# Patient Record
Sex: FEMALE | Race: WHITE | Employment: UNEMPLOYED | URBAN - NONMETROPOLITAN AREA
[De-identification: names, ages, dates, MRNs, and addresses within clinical notes are randomized per-mention and may not be internally consistent; named-entity substitution may affect disease eponyms.]

---

## 2023-04-28 ENCOUNTER — OFFICE VISIT (OUTPATIENT)
Dept: PRIMARY CARE CLINIC | Age: 18
End: 2023-04-28
Payer: COMMERCIAL

## 2023-04-28 VITALS
HEIGHT: 59 IN | DIASTOLIC BLOOD PRESSURE: 70 MMHG | WEIGHT: 264 LBS | HEART RATE: 94 BPM | TEMPERATURE: 97.6 F | SYSTOLIC BLOOD PRESSURE: 110 MMHG | OXYGEN SATURATION: 99 % | BODY MASS INDEX: 53.22 KG/M2

## 2023-04-28 DIAGNOSIS — E66.01 CLASS 3 SEVERE OBESITY WITH BODY MASS INDEX (BMI) OF 50.0 TO 59.9 IN ADULT, UNSPECIFIED OBESITY TYPE, UNSPECIFIED WHETHER SERIOUS COMORBIDITY PRESENT (HCC): Primary | ICD-10-CM

## 2023-04-28 DIAGNOSIS — Z30.09 ENCOUNTER FOR EVALUATION REGARDING CONTRACEPTION OPTIONS: ICD-10-CM

## 2023-04-28 DIAGNOSIS — L30.9 ECZEMA, UNSPECIFIED TYPE: ICD-10-CM

## 2023-04-28 PROCEDURE — 1036F TOBACCO NON-USER: CPT | Performed by: NURSE PRACTITIONER

## 2023-04-28 PROCEDURE — 99204 OFFICE O/P NEW MOD 45 MIN: CPT | Performed by: NURSE PRACTITIONER

## 2023-04-28 PROCEDURE — G8417 CALC BMI ABV UP PARAM F/U: HCPCS | Performed by: NURSE PRACTITIONER

## 2023-04-28 PROCEDURE — G8427 DOCREV CUR MEDS BY ELIG CLIN: HCPCS | Performed by: NURSE PRACTITIONER

## 2023-04-28 RX ORDER — TRIAMCINOLONE ACETONIDE 1 MG/G
CREAM TOPICAL
Qty: 45 G | Refills: 0 | Status: SHIPPED | OUTPATIENT
Start: 2023-04-28

## 2023-04-28 RX ORDER — PRAZOSIN HYDROCHLORIDE 2 MG/1
CAPSULE ORAL
COMMUNITY
Start: 2023-04-11

## 2023-04-28 RX ORDER — ACETAMINOPHEN AND CODEINE PHOSPHATE 120; 12 MG/5ML; MG/5ML
SOLUTION ORAL
COMMUNITY
Start: 2023-02-16

## 2023-04-28 RX ORDER — FLUOXETINE HYDROCHLORIDE 20 MG/1
20 CAPSULE ORAL NIGHTLY
COMMUNITY
Start: 2023-04-11

## 2023-04-28 RX ORDER — ACETAMINOPHEN 325 MG/1
TABLET ORAL EVERY 4 HOURS PRN
COMMUNITY
Start: 2013-03-20

## 2023-04-28 RX ORDER — OMEPRAZOLE 20 MG/1
20 CAPSULE, DELAYED RELEASE ORAL DAILY
COMMUNITY
Start: 2017-05-31

## 2023-04-28 SDOH — ECONOMIC STABILITY: HOUSING INSECURITY
IN THE LAST 12 MONTHS, WAS THERE A TIME WHEN YOU DID NOT HAVE A STEADY PLACE TO SLEEP OR SLEPT IN A SHELTER (INCLUDING NOW)?: NO

## 2023-04-28 SDOH — ECONOMIC STABILITY: FOOD INSECURITY: WITHIN THE PAST 12 MONTHS, THE FOOD YOU BOUGHT JUST DIDN'T LAST AND YOU DIDN'T HAVE MONEY TO GET MORE.: NEVER TRUE

## 2023-04-28 SDOH — ECONOMIC STABILITY: INCOME INSECURITY: HOW HARD IS IT FOR YOU TO PAY FOR THE VERY BASICS LIKE FOOD, HOUSING, MEDICAL CARE, AND HEATING?: NOT HARD AT ALL

## 2023-04-28 SDOH — ECONOMIC STABILITY: FOOD INSECURITY: WITHIN THE PAST 12 MONTHS, YOU WORRIED THAT YOUR FOOD WOULD RUN OUT BEFORE YOU GOT MONEY TO BUY MORE.: NEVER TRUE

## 2023-04-28 ASSESSMENT — ANXIETY QUESTIONNAIRES
GAD7 TOTAL SCORE: 19
4. TROUBLE RELAXING: NEARLY EVERY DAY
5. BEING SO RESTLESS THAT IT IS HARD TO SIT STILL: 3
IF YOU CHECKED OFF ANY PROBLEMS ON THIS QUESTIONNAIRE, HOW DIFFICULT HAVE THESE PROBLEMS MADE IT FOR YOU TO DO YOUR WORK, TAKE CARE OF THINGS AT HOME, OR GET ALONG WITH OTHER PEOPLE: EXTREMELY DIFFICULT
3. WORRYING TOO MUCH ABOUT DIFFERENT THINGS: 3
7. FEELING AFRAID AS IF SOMETHING AWFUL MIGHT HAPPEN: 3
IF YOU CHECKED OFF ANY PROBLEMS ON THIS QUESTIONNAIRE, HOW DIFFICULT HAVE THESE PROBLEMS MADE IT FOR YOU TO DO YOUR WORK, TAKE CARE OF THINGS AT HOME, OR GET ALONG WITH OTHER PEOPLE: EXTREMELY DIFFICULT
1. FEELING NERVOUS, ANXIOUS, OR ON EDGE: SEVERAL DAYS
7. FEELING AFRAID AS IF SOMETHING AWFUL MIGHT HAPPEN: NEARLY EVERY DAY
1. FEELING NERVOUS, ANXIOUS, OR ON EDGE: 1
2. NOT BEING ABLE TO STOP OR CONTROL WORRYING: NEARLY EVERY DAY
6. BECOMING EASILY ANNOYED OR IRRITABLE: NEARLY EVERY DAY
5. BEING SO RESTLESS THAT IT IS HARD TO SIT STILL: NEARLY EVERY DAY
6. BECOMING EASILY ANNOYED OR IRRITABLE: 3
3. WORRYING TOO MUCH ABOUT DIFFERENT THINGS: NEARLY EVERY DAY
2. NOT BEING ABLE TO STOP OR CONTROL WORRYING: 3
4. TROUBLE RELAXING: 3

## 2023-04-28 ASSESSMENT — ENCOUNTER SYMPTOMS
RESPIRATORY NEGATIVE: 1
GASTROINTESTINAL NEGATIVE: 1
EYES NEGATIVE: 1

## 2023-04-28 ASSESSMENT — LIFESTYLE VARIABLES
HOW OFTEN DO YOU HAVE A DRINK CONTAINING ALCOHOL: NEVER
HOW OFTEN DO YOU HAVE SIX OR MORE DRINKS ON ONE OCCASION: 1
HOW MANY STANDARD DRINKS CONTAINING ALCOHOL DO YOU HAVE ON A TYPICAL DAY: 0
HOW OFTEN DO YOU HAVE A DRINK CONTAINING ALCOHOL: 1
HOW MANY STANDARD DRINKS CONTAINING ALCOHOL DO YOU HAVE ON A TYPICAL DAY: PATIENT DOES NOT DRINK

## 2023-04-28 ASSESSMENT — PATIENT HEALTH QUESTIONNAIRE - PHQ9
6. FEELING BAD ABOUT YOURSELF - OR THAT YOU ARE A FAILURE OR HAVE LET YOURSELF OR YOUR FAMILY DOWN: 3
1. LITTLE INTEREST OR PLEASURE IN DOING THINGS: 3
2. FEELING DOWN, DEPRESSED OR HOPELESS: 1
3. TROUBLE FALLING OR STAYING ASLEEP: 3
10. IF YOU CHECKED OFF ANY PROBLEMS, HOW DIFFICULT HAVE THESE PROBLEMS MADE IT FOR YOU TO DO YOUR WORK, TAKE CARE OF THINGS AT HOME, OR GET ALONG WITH OTHER PEOPLE: 3
SUM OF ALL RESPONSES TO PHQ QUESTIONS 1-9: 22
SUM OF ALL RESPONSES TO PHQ QUESTIONS 1-9: 22
7. TROUBLE CONCENTRATING ON THINGS, SUCH AS READING THE NEWSPAPER OR WATCHING TELEVISION: 3
SUM OF ALL RESPONSES TO PHQ QUESTIONS 1-9: 22
5. POOR APPETITE OR OVEREATING: 3
9. THOUGHTS THAT YOU WOULD BE BETTER OFF DEAD, OR OF HURTING YOURSELF: 0
SUM OF ALL RESPONSES TO PHQ9 QUESTIONS 1 & 2: 4
8. MOVING OR SPEAKING SO SLOWLY THAT OTHER PEOPLE COULD HAVE NOTICED. OR THE OPPOSITE, BEING SO FIGETY OR RESTLESS THAT YOU HAVE BEEN MOVING AROUND A LOT MORE THAN USUAL: 3
4. FEELING TIRED OR HAVING LITTLE ENERGY: 3
SUM OF ALL RESPONSES TO PHQ QUESTIONS 1-9: 22

## 2023-04-28 NOTE — PROGRESS NOTES
Subjective  CC: Patient presents to [facility]. Last saw pediatrician, it's been a few years. HPI:   Patient presents to [facility]. She does follow with psychiatry, she sees Briana Ortega. She does not follow with any other specialists. We reviewed her past medical history, family history, and social history. Her primary concern today is related to her weight. She reports she has gained about 30 pounds over the last 3 years, she does not feel that her activity level or diet has changed significantly. She would be interested in bariatric surgery, but she does need evaluation prior to this. She would also like to be seen by gynecology for evaluation for Implanon. Additionally, she has eczematous changes to bilateral hands and over-the-counter therapies are not working. She feels that her psychiatric conditions are very well controlled. ROS:  Review of Systems   Constitutional:         Weight gain, obesity   HENT: Negative. Eyes: Negative. Respiratory: Negative. Cardiovascular: Negative. Gastrointestinal: Negative. Genitourinary: Negative. Musculoskeletal: Negative. Skin: Negative. Neurological:         Eczema   Psychiatric/Behavioral:          See above - follows with psych        Current Outpatient Medications:     acetaminophen (TYLENOL) 325 MG tablet, Take by mouth every 4 hours as needed, Disp: , Rfl:     FLUoxetine (PROZAC) 20 MG capsule, Take 1 capsule by mouth nightly, Disp: , Rfl:     Melatonin 5 MG CAPS, Take by mouth nightly, Disp: , Rfl:     norethindrone (MICRONOR) 0.35 MG tablet, , Disp: , Rfl:     omeprazole (PRILOSEC) 20 MG delayed release capsule, Take 1 capsule by mouth daily, Disp: , Rfl:     prazosin (MINIPRESS) 2 MG capsule, take 2 capsules by mouth at bedtime if needed, Disp: , Rfl:     triamcinolone (KENALOG) 0.1 % cream, Apply topically 2 times daily. , Disp: 45 g, Rfl: 0   No Known Allergies     PMH:  Past Medical History:   Diagnosis Date    ADHD

## 2023-05-01 DIAGNOSIS — E66.01 CLASS 3 SEVERE OBESITY WITH BODY MASS INDEX (BMI) OF 50.0 TO 59.9 IN ADULT, UNSPECIFIED OBESITY TYPE, UNSPECIFIED WHETHER SERIOUS COMORBIDITY PRESENT (HCC): ICD-10-CM

## 2023-05-01 LAB
ALBUMIN SERPL-MCNC: 3.9 G/DL (ref 3.5–5.2)
ALP SERPL-CCNC: 68 U/L (ref 35–104)
ALT SERPL-CCNC: 19 U/L (ref 0–32)
ANION GAP SERPL CALCULATED.3IONS-SCNC: 12 MMOL/L (ref 7–16)
AST SERPL-CCNC: 22 U/L (ref 0–31)
BASOPHILS # BLD: 0.04 E9/L (ref 0–0.2)
BASOPHILS NFR BLD: 0.7 % (ref 0–2)
BILIRUB SERPL-MCNC: <0.2 MG/DL (ref 0–1.2)
BUN SERPL-MCNC: 13 MG/DL (ref 6–20)
CALCIUM SERPL-MCNC: 9 MG/DL (ref 8.6–10.2)
CHLORIDE SERPL-SCNC: 107 MMOL/L (ref 98–107)
CHOLESTEROL, TOTAL: 166 MG/DL (ref 0–199)
CO2 SERPL-SCNC: 21 MMOL/L (ref 22–29)
CREAT SERPL-MCNC: 0.6 MG/DL (ref 0.5–1)
EOSINOPHIL # BLD: 0.08 E9/L (ref 0.05–0.5)
EOSINOPHIL NFR BLD: 1.3 % (ref 0–6)
ERYTHROCYTE [DISTWIDTH] IN BLOOD BY AUTOMATED COUNT: 16.9 FL (ref 11.5–15)
GLUCOSE SERPL-MCNC: 94 MG/DL (ref 74–99)
HCT VFR BLD AUTO: 37.7 % (ref 34–48)
HDLC SERPL-MCNC: 44 MG/DL
HGB BLD-MCNC: 10.4 G/DL (ref 11.5–15.5)
IMM GRANULOCYTES # BLD: 0.01 E9/L
IMM GRANULOCYTES NFR BLD: 0.2 % (ref 0–5)
LDLC SERPL CALC-MCNC: 95 MG/DL (ref 0–99)
LYMPHOCYTES # BLD: 2.25 E9/L (ref 1.5–4)
LYMPHOCYTES NFR BLD: 36.6 % (ref 20–42)
MCH RBC QN AUTO: 22.8 PG (ref 26–35)
MCHC RBC AUTO-ENTMCNC: 27.6 % (ref 32–34.5)
MCV RBC AUTO: 82.5 FL (ref 80–99.9)
MONOCYTES # BLD: 0.6 E9/L (ref 0.1–0.95)
MONOCYTES NFR BLD: 9.8 % (ref 2–12)
NEUTROPHILS # BLD: 3.17 E9/L (ref 1.8–7.3)
NEUTS SEG NFR BLD: 51.4 % (ref 43–80)
PLATELET # BLD AUTO: 357 E9/L (ref 130–450)
PMV BLD AUTO: 11.5 FL (ref 7–12)
POTASSIUM SERPL-SCNC: 4.1 MMOL/L (ref 3.5–5)
PROT SERPL-MCNC: 6.5 G/DL (ref 6.4–8.3)
RBC # BLD AUTO: 4.57 E12/L (ref 3.5–5.5)
SODIUM SERPL-SCNC: 140 MMOL/L (ref 132–146)
T4 FREE SERPL-MCNC: 0.94 NG/DL (ref 0.93–1.7)
TRIGL SERPL-MCNC: 136 MG/DL (ref 0–149)
TSH SERPL-MCNC: 3.99 UIU/ML (ref 0.27–4.2)
VLDLC SERPL CALC-MCNC: 27 MG/DL
WBC # BLD: 6.2 E9/L (ref 4.5–11.5)

## 2023-05-02 DIAGNOSIS — D64.9 ANEMIA, UNSPECIFIED TYPE: Primary | ICD-10-CM

## 2023-05-08 DIAGNOSIS — D64.9 ANEMIA, UNSPECIFIED TYPE: ICD-10-CM

## 2023-05-08 LAB
IRON SATN MFR SERPL: 7 % (ref 15–50)
IRON SERPL-MCNC: 29 MCG/DL (ref 37–145)
TIBC SERPL-MCNC: 419 MCG/DL (ref 250–450)
VIT B12 SERPL-MCNC: 315 PG/ML (ref 211–946)

## 2023-05-09 DIAGNOSIS — D50.9 IRON DEFICIENCY ANEMIA, UNSPECIFIED IRON DEFICIENCY ANEMIA TYPE: Primary | ICD-10-CM

## 2023-10-25 ENCOUNTER — TELEPHONE (OUTPATIENT)
Dept: PRIMARY CARE CLINIC | Age: 18
End: 2023-10-25

## 2023-10-25 NOTE — TELEPHONE ENCOUNTER
Called pt to reschedule appointment on 11/10 due to [facility] being on [id number] [address], no answer, left message to return call.

## 2025-07-20 ASSESSMENT — ANXIETY QUESTIONNAIRES
5. BEING SO RESTLESS THAT IT IS HARD TO SIT STILL: MORE THAN HALF THE DAYS
3. WORRYING TOO MUCH ABOUT DIFFERENT THINGS: SEVERAL DAYS
6. BECOMING EASILY ANNOYED OR IRRITABLE: MORE THAN HALF THE DAYS
2. NOT BEING ABLE TO STOP OR CONTROL WORRYING: SEVERAL DAYS
6. BECOMING EASILY ANNOYED OR IRRITABLE: MORE THAN HALF THE DAYS
4. TROUBLE RELAXING: SEVERAL DAYS
IF YOU CHECKED OFF ANY PROBLEMS ON THIS QUESTIONNAIRE, HOW DIFFICULT HAVE THESE PROBLEMS MADE IT FOR YOU TO DO YOUR WORK, TAKE CARE OF THINGS AT HOME, OR GET ALONG WITH OTHER PEOPLE: SOMEWHAT DIFFICULT
2. NOT BEING ABLE TO STOP OR CONTROL WORRYING: SEVERAL DAYS
1. FEELING NERVOUS, ANXIOUS, OR ON EDGE: SEVERAL DAYS
5. BEING SO RESTLESS THAT IT IS HARD TO SIT STILL: MORE THAN HALF THE DAYS
7. FEELING AFRAID AS IF SOMETHING AWFUL MIGHT HAPPEN: NOT AT ALL
GAD7 TOTAL SCORE: 8
1. FEELING NERVOUS, ANXIOUS, OR ON EDGE: SEVERAL DAYS
IF YOU CHECKED OFF ANY PROBLEMS ON THIS QUESTIONNAIRE, HOW DIFFICULT HAVE THESE PROBLEMS MADE IT FOR YOU TO DO YOUR WORK, TAKE CARE OF THINGS AT HOME, OR GET ALONG WITH OTHER PEOPLE: SOMEWHAT DIFFICULT
4. TROUBLE RELAXING: SEVERAL DAYS
7. FEELING AFRAID AS IF SOMETHING AWFUL MIGHT HAPPEN: NOT AT ALL
3. WORRYING TOO MUCH ABOUT DIFFERENT THINGS: SEVERAL DAYS

## 2025-07-20 ASSESSMENT — PATIENT HEALTH QUESTIONNAIRE - PHQ9
1. LITTLE INTEREST OR PLEASURE IN DOING THINGS: SEVERAL DAYS
SUM OF ALL RESPONSES TO PHQ QUESTIONS 1-9: 2
SUM OF ALL RESPONSES TO PHQ QUESTIONS 1-9: 2
2. FEELING DOWN, DEPRESSED OR HOPELESS: SEVERAL DAYS
2. FEELING DOWN, DEPRESSED OR HOPELESS: SEVERAL DAYS
SUM OF ALL RESPONSES TO PHQ9 QUESTIONS 1 & 2: 2
SUM OF ALL RESPONSES TO PHQ QUESTIONS 1-9: 2
SUM OF ALL RESPONSES TO PHQ QUESTIONS 1-9: 2
1. LITTLE INTEREST OR PLEASURE IN DOING THINGS: SEVERAL DAYS

## 2025-07-23 SDOH — ECONOMIC STABILITY: INCOME INSECURITY: IN THE LAST 12 MONTHS, WAS THERE A TIME WHEN YOU WERE NOT ABLE TO PAY THE MORTGAGE OR RENT ON TIME?: NO

## 2025-07-23 SDOH — ECONOMIC STABILITY: TRANSPORTATION INSECURITY
IN THE PAST 12 MONTHS, HAS THE LACK OF TRANSPORTATION KEPT YOU FROM MEDICAL APPOINTMENTS OR FROM GETTING MEDICATIONS?: NO

## 2025-07-23 SDOH — ECONOMIC STABILITY: FOOD INSECURITY: WITHIN THE PAST 12 MONTHS, YOU WORRIED THAT YOUR FOOD WOULD RUN OUT BEFORE YOU GOT MONEY TO BUY MORE.: NEVER TRUE

## 2025-07-23 SDOH — ECONOMIC STABILITY: FOOD INSECURITY: WITHIN THE PAST 12 MONTHS, THE FOOD YOU BOUGHT JUST DIDN'T LAST AND YOU DIDN'T HAVE MONEY TO GET MORE.: NEVER TRUE

## 2025-07-23 SDOH — ECONOMIC STABILITY: TRANSPORTATION INSECURITY
IN THE PAST 12 MONTHS, HAS LACK OF TRANSPORTATION KEPT YOU FROM MEETINGS, WORK, OR FROM GETTING THINGS NEEDED FOR DAILY LIVING?: NO

## 2025-07-24 ENCOUNTER — OFFICE VISIT (OUTPATIENT)
Dept: PRIMARY CARE CLINIC | Age: 20
End: 2025-07-24
Payer: COMMERCIAL

## 2025-07-24 VITALS
HEIGHT: 59 IN | OXYGEN SATURATION: 99 % | SYSTOLIC BLOOD PRESSURE: 118 MMHG | BODY MASS INDEX: 57.49 KG/M2 | HEART RATE: 87 BPM | WEIGHT: 285.2 LBS | RESPIRATION RATE: 18 BRPM | TEMPERATURE: 98 F | DIASTOLIC BLOOD PRESSURE: 70 MMHG

## 2025-07-24 DIAGNOSIS — E66.01 MORBID OBESITY (HCC): ICD-10-CM

## 2025-07-24 DIAGNOSIS — G56.03 CARPAL TUNNEL SYNDROME, BILATERAL: ICD-10-CM

## 2025-07-24 DIAGNOSIS — E61.1 IRON DEFICIENCY: ICD-10-CM

## 2025-07-24 DIAGNOSIS — Z12.4 CERVICAL CANCER SCREENING: ICD-10-CM

## 2025-07-24 DIAGNOSIS — E66.01 MORBID OBESITY (HCC): Primary | ICD-10-CM

## 2025-07-24 DIAGNOSIS — K59.00 CONSTIPATION, UNSPECIFIED CONSTIPATION TYPE: ICD-10-CM

## 2025-07-24 LAB
ALBUMIN: 3.8 G/DL (ref 3.5–5.2)
ALP BLD-CCNC: 57 U/L (ref 35–104)
ALT SERPL-CCNC: 19 U/L (ref 0–35)
ANION GAP SERPL CALCULATED.3IONS-SCNC: 12 MMOL/L (ref 7–16)
AST SERPL-CCNC: 25 U/L (ref 0–35)
BASOPHILS ABSOLUTE: 0.05 K/UL (ref 0–0.2)
BASOPHILS RELATIVE PERCENT: 1 % (ref 0–2)
BILIRUB SERPL-MCNC: <0.2 MG/DL (ref 0–1.2)
BUN BLDV-MCNC: 12 MG/DL (ref 6–20)
CALCIUM SERPL-MCNC: 9.1 MG/DL (ref 8.6–10)
CHLORIDE BLD-SCNC: 107 MMOL/L (ref 98–107)
CHOLESTEROL, TOTAL: 143 MG/DL
CO2: 18 MMOL/L (ref 22–29)
CREAT SERPL-MCNC: 0.7 MG/DL (ref 0.5–1)
EOSINOPHILS ABSOLUTE: 0.09 K/UL (ref 0.05–0.5)
EOSINOPHILS RELATIVE PERCENT: 1 % (ref 0–6)
GFR, ESTIMATED: >90 ML/MIN/1.73M2
GLUCOSE BLD-MCNC: 94 MG/DL (ref 74–99)
HCT VFR BLD CALC: 38.3 % (ref 34–48)
HDLC SERPL-MCNC: 46 MG/DL
HEMOGLOBIN: 11.5 G/DL (ref 11.5–15.5)
IMMATURE GRANULOCYTES %: 0 % (ref 0–5)
IMMATURE GRANULOCYTES ABSOLUTE: <0.03 K/UL (ref 0–0.58)
IRON % SATURATION: 5 % (ref 15–50)
IRON: 20 UG/DL (ref 37–145)
LDL CHOLESTEROL: 80 MG/DL
LYMPHOCYTES ABSOLUTE: 2.42 K/UL (ref 1.5–4)
LYMPHOCYTES RELATIVE PERCENT: 34 % (ref 20–42)
MCH RBC QN AUTO: 23.9 PG (ref 26–35)
MCHC RBC AUTO-ENTMCNC: 30 G/DL (ref 32–34.5)
MCV RBC AUTO: 79.5 FL (ref 80–99.9)
MONOCYTES ABSOLUTE: 0.81 K/UL (ref 0.1–0.95)
MONOCYTES RELATIVE PERCENT: 11 % (ref 2–12)
NEUTROPHILS ABSOLUTE: 3.83 K/UL (ref 1.8–7.3)
NEUTROPHILS RELATIVE PERCENT: 53 % (ref 43–80)
PDW BLD-RTO: 17 % (ref 11.5–15)
PLATELET # BLD: 334 K/UL (ref 130–450)
PMV BLD AUTO: 11.4 FL (ref 7–12)
POTASSIUM SERPL-SCNC: 4.3 MMOL/L (ref 3.5–5.1)
RBC # BLD: 4.82 M/UL (ref 3.5–5.5)
SODIUM BLD-SCNC: 137 MMOL/L (ref 136–145)
TOTAL IRON BINDING CAPACITY: 377 UG/DL (ref 250–450)
TOTAL PROTEIN: 6.8 G/DL (ref 6.4–8.3)
TRIGL SERPL-MCNC: 85 MG/DL
TSH SERPL DL<=0.05 MIU/L-ACNC: 4.8 UIU/ML (ref 0.27–4.2)
VLDLC SERPL CALC-MCNC: 17 MG/DL
WBC # BLD: 7.2 K/UL (ref 4.5–11.5)

## 2025-07-24 PROCEDURE — 1036F TOBACCO NON-USER: CPT | Performed by: NURSE PRACTITIONER

## 2025-07-24 PROCEDURE — G8417 CALC BMI ABV UP PARAM F/U: HCPCS | Performed by: NURSE PRACTITIONER

## 2025-07-24 PROCEDURE — 99214 OFFICE O/P EST MOD 30 MIN: CPT | Performed by: NURSE PRACTITIONER

## 2025-07-24 PROCEDURE — G8427 DOCREV CUR MEDS BY ELIG CLIN: HCPCS | Performed by: NURSE PRACTITIONER

## 2025-07-24 RX ORDER — WHEAT DEXTRIN 3 G/3.8 G
4 POWDER (GRAM) ORAL
Qty: 350 G | Refills: 1 | Status: SHIPPED | OUTPATIENT
Start: 2025-07-24

## 2025-07-24 RX ORDER — FLUOXETINE HYDROCHLORIDE 40 MG/1
40 CAPSULE ORAL DAILY
COMMUNITY
Start: 2025-07-23

## 2025-07-24 RX ORDER — OMEPRAZOLE 20 MG/1
20 CAPSULE, DELAYED RELEASE ORAL DAILY
Qty: 30 CAPSULE | Refills: 5 | Status: SHIPPED | OUTPATIENT
Start: 2025-07-24

## 2025-07-24 ASSESSMENT — ENCOUNTER SYMPTOMS
EYES NEGATIVE: 1
RESPIRATORY NEGATIVE: 1

## 2025-07-24 NOTE — PROGRESS NOTES
(with meals), Disp: 350 g, Rfl: 1    Melatonin 5 MG CAPS, Take by mouth nightly, Disp: , Rfl:     prazosin (MINIPRESS) 2 MG capsule, take 2 capsules by mouth at bedtime if needed, Disp: , Rfl:    No Known Allergies     PMH:  Past Medical History:   Diagnosis Date    ADHD     Anxiety     Autism     Depression     Headache     Obesity     OCD (obsessive compulsive disorder)         Objective  Vitals:    07/24/25 1108   BP: 118/70   Pulse: 87   Resp: 18   Temp: 98 °F (36.7 °C)   TempSrc: Temporal   SpO2: 99%   Weight: 129.4 kg (285 lb 3.2 oz)   Height: 1.499 m (4' 11\")      Exam:  Const: Appears healthy, well developed and well nourished. Appears morbidly obese.  Eyes: EOMI in both eyes. PERRL.  ENMT: External ears WNL. Tympanic membranes are intact. Septum is in the midline. Posterior  pharynx shows no exudate, irritation or redness.  Neck: Supple and symmetric. Palpation reveals no adenopathy. No masses appreciated. Thyroid  exhibits no nodule or thyromegaly. No JVD.  Resp: Respirations are unlabored. Respiration rate is normal. Auscultate good airflow. No rales,  rhonchi or wheezes appreciated over the lungs bilaterally.  Abdomen: BS x 4 quadrants. Abdomen soft, round, nontender. No organomegaly noted.  CV: Rhythm is regular. S1 is normal. S2 is normal.  Extremities: No clubbing or cyanosis.   Musculo: Walks with a normal gait. Upper Extremities: Full ROM bilaterally.  Patient does have positive Phalen's bilaterally.  Negative Tinel's and Finkelstein's.   strength is intact.  Lower Extremities: Full  ROM bilaterally.   Skin: Dry and warm.  Eczematous changes to the bilateral wrists.  Multiple facial piercings  Neuro: Alert and oriented x3. Mood is normal. Affect is normal. Speech is articulate and fluent.    Norma was seen today for annual exam.    Diagnoses and all orders for this visit:    Morbid obesity (HCC)  -     CBC with Auto Differential; Future  -     Comprehensive Metabolic Panel; Future  -     Lipid

## 2025-07-31 ENCOUNTER — RESULTS FOLLOW-UP (OUTPATIENT)
Dept: PRIMARY CARE CLINIC | Age: 20
End: 2025-07-31

## 2025-08-07 ENCOUNTER — TELEPHONE (OUTPATIENT)
Age: 20
End: 2025-08-07

## 2025-08-22 ENCOUNTER — OFFICE VISIT (OUTPATIENT)
Dept: PRIMARY CARE CLINIC | Age: 20
End: 2025-08-22

## 2025-08-22 VITALS
HEART RATE: 102 BPM | TEMPERATURE: 98.6 F | RESPIRATION RATE: 18 BRPM | SYSTOLIC BLOOD PRESSURE: 112 MMHG | OXYGEN SATURATION: 99 % | WEIGHT: 286 LBS | BODY MASS INDEX: 57.66 KG/M2 | DIASTOLIC BLOOD PRESSURE: 70 MMHG | HEIGHT: 59 IN

## 2025-08-22 DIAGNOSIS — Z12.4 CERVICAL CANCER SCREENING: Primary | ICD-10-CM

## 2025-08-22 DIAGNOSIS — L30.4 INTERTRIGO: ICD-10-CM

## 2025-08-22 RX ORDER — NYSTATIN 100000 U/G
CREAM TOPICAL
Qty: 30 G | Refills: 0 | Status: SHIPPED | OUTPATIENT
Start: 2025-08-22

## 2025-08-22 ASSESSMENT — ENCOUNTER SYMPTOMS
RESPIRATORY NEGATIVE: 1
EYES NEGATIVE: 1
ROS SKIN COMMENTS: AS ABOVE